# Patient Record
Sex: MALE | Race: WHITE | NOT HISPANIC OR LATINO | Employment: OTHER | URBAN - NONMETROPOLITAN AREA
[De-identification: names, ages, dates, MRNs, and addresses within clinical notes are randomized per-mention and may not be internally consistent; named-entity substitution may affect disease eponyms.]

---

## 2022-06-03 DIAGNOSIS — Z95.0 MRI SAFE CARDIAC PACEMAKER IN SITU: ICD-10-CM

## 2022-06-03 DIAGNOSIS — Z95.0 CARDIAC PACEMAKER IN SITU: Primary | ICD-10-CM

## 2022-06-03 DIAGNOSIS — I49.5 SICK SINUS SYNDROME (H): ICD-10-CM

## 2022-06-07 ENCOUNTER — HOSPITAL ENCOUNTER (OUTPATIENT)
Dept: CARDIOLOGY | Facility: OTHER | Age: 77
Discharge: HOME | End: 2022-06-07
Attending: INTERNAL MEDICINE
Payer: MEDICARE

## 2022-06-07 ENCOUNTER — HOSPITAL ENCOUNTER (OUTPATIENT)
Dept: GENERAL RADIOLOGY | Facility: OTHER | Age: 77
Discharge: HOME | End: 2022-06-07
Attending: RADIOLOGY
Payer: MEDICARE

## 2022-06-07 ENCOUNTER — HOSPITAL ENCOUNTER (OUTPATIENT)
Dept: MRI IMAGING | Facility: OTHER | Age: 77
Discharge: HOME | End: 2022-06-07
Attending: NURSE PRACTITIONER
Payer: MEDICARE

## 2022-06-07 DIAGNOSIS — Z95.0 MRI SAFE CARDIAC PACEMAKER IN SITU: ICD-10-CM

## 2022-06-07 DIAGNOSIS — G62.9 PERIPHERAL POLYNEUROPATHY: ICD-10-CM

## 2022-06-07 DIAGNOSIS — M54.50 CHRONIC BILATERAL LOW BACK PAIN WITHOUT SCIATICA: ICD-10-CM

## 2022-06-07 DIAGNOSIS — I49.5 SICK SINUS SYNDROME (H): ICD-10-CM

## 2022-06-07 DIAGNOSIS — Z95.0 CARDIAC PACEMAKER IN SITU: ICD-10-CM

## 2022-06-07 DIAGNOSIS — G89.29 CHRONIC BILATERAL LOW BACK PAIN WITHOUT SCIATICA: ICD-10-CM

## 2022-06-07 DIAGNOSIS — Z45.018 ENCOUNTER FOR INTERROGATION OF CARDIAC PACEMAKER: ICD-10-CM

## 2022-06-07 PROCEDURE — 93280 PM DEVICE PROGR EVAL DUAL: CPT | Performed by: INTERNAL MEDICINE

## 2022-06-07 PROCEDURE — 71046 X-RAY EXAM CHEST 2 VIEWS: CPT

## 2022-06-07 PROCEDURE — 72148 MRI LUMBAR SPINE W/O DYE: CPT

## 2022-06-10 LAB
MDC_IDC_LEAD_IMPLANT_DT: NORMAL
MDC_IDC_LEAD_LOCATION: NORMAL
MDC_IDC_LEAD_LOCATION_DETAIL_1: NORMAL
MDC_IDC_LEAD_MFG: NORMAL
MDC_IDC_LEAD_MODEL: NORMAL
MDC_IDC_LEAD_POLARITY_TYPE: NORMAL
MDC_IDC_LEAD_SERIAL: NORMAL
MDC_IDC_MSMT_BATTERY_DTM: NORMAL
MDC_IDC_MSMT_BATTERY_DTM: NORMAL
MDC_IDC_MSMT_BATTERY_REMAINING_LONGEVITY: 168 MO
MDC_IDC_MSMT_BATTERY_REMAINING_LONGEVITY: 168 MO
MDC_IDC_MSMT_BATTERY_STATUS: NORMAL
MDC_IDC_MSMT_BATTERY_STATUS: NORMAL
MDC_IDC_MSMT_LEADCHNL_RA_IMPEDANCE_VALUE: 636 OHM
MDC_IDC_MSMT_LEADCHNL_RA_IMPEDANCE_VALUE: 651 OHM
MDC_IDC_MSMT_LEADCHNL_RA_PACING_THRESHOLD_AMPLITUDE: 1.1 V
MDC_IDC_MSMT_LEADCHNL_RA_PACING_THRESHOLD_AMPLITUDE: 1.1 V
MDC_IDC_MSMT_LEADCHNL_RA_PACING_THRESHOLD_PULSEWIDTH: 0.4 MS
MDC_IDC_MSMT_LEADCHNL_RA_PACING_THRESHOLD_PULSEWIDTH: 0.4 MS
MDC_IDC_MSMT_LEADCHNL_RA_SENSING_INTR_AMPL: 2.5 MV
MDC_IDC_MSMT_LEADCHNL_RA_SENSING_INTR_AMPL: 4 MV
MDC_IDC_MSMT_LEADCHNL_RV_IMPEDANCE_VALUE: 649 OHM
MDC_IDC_MSMT_LEADCHNL_RV_IMPEDANCE_VALUE: 668 OHM
MDC_IDC_MSMT_LEADCHNL_RV_PACING_THRESHOLD_AMPLITUDE: 0.7 V
MDC_IDC_MSMT_LEADCHNL_RV_PACING_THRESHOLD_AMPLITUDE: 0.7 V
MDC_IDC_MSMT_LEADCHNL_RV_PACING_THRESHOLD_PULSEWIDTH: 0.4 MS
MDC_IDC_MSMT_LEADCHNL_RV_PACING_THRESHOLD_PULSEWIDTH: 0.4 MS
MDC_IDC_MSMT_LEADCHNL_RV_SENSING_INTR_AMPL: 10.2 MV
MDC_IDC_MSMT_LEADCHNL_RV_SENSING_INTR_AMPL: 10.4 MV
MDC_IDC_PG_IMPLANT_DTM: NORMAL
MDC_IDC_PG_IMPLANT_DTM: NORMAL
MDC_IDC_PG_MFG: NORMAL
MDC_IDC_PG_MFG: NORMAL
MDC_IDC_PG_MODEL: NORMAL
MDC_IDC_PG_MODEL: NORMAL
MDC_IDC_PG_SERIAL: NORMAL
MDC_IDC_PG_SERIAL: NORMAL
MDC_IDC_PG_TYPE: NORMAL
MDC_IDC_PG_TYPE: NORMAL
MDC_IDC_SESS_CLINIC_NAME: NORMAL
MDC_IDC_SESS_CLINIC_NAME: NORMAL
MDC_IDC_SESS_DTM: NORMAL
MDC_IDC_SESS_DTM: NORMAL
MDC_IDC_SESS_TYPE: NORMAL
MDC_IDC_SESS_TYPE: NORMAL
MDC_IDC_SET_BRADY_AT_MODE_SWITCH_MODE: NORMAL
MDC_IDC_SET_BRADY_AT_MODE_SWITCH_MODE: NORMAL
MDC_IDC_SET_BRADY_AT_MODE_SWITCH_RATE: 170 {BEATS}/MIN
MDC_IDC_SET_BRADY_AT_MODE_SWITCH_RATE: 170 {BEATS}/MIN
MDC_IDC_SET_BRADY_LOWRATE: 60 {BEATS}/MIN
MDC_IDC_SET_BRADY_MAX_SENSOR_RATE: 130 {BEATS}/MIN
MDC_IDC_SET_BRADY_MAX_TRACKING_RATE: 130 {BEATS}/MIN
MDC_IDC_SET_BRADY_MODE: NORMAL
MDC_IDC_SET_BRADY_MODE: NORMAL
MDC_IDC_SET_BRADY_PAV_DELAY_HIGH: 150 MS
MDC_IDC_SET_BRADY_PAV_DELAY_HIGH: 150 MS
MDC_IDC_SET_BRADY_PAV_DELAY_LOW: 270 MS
MDC_IDC_SET_BRADY_PAV_DELAY_LOW: 270 MS
MDC_IDC_SET_BRADY_SAV_DELAY_HIGH: 150 MS
MDC_IDC_SET_BRADY_SAV_DELAY_HIGH: 150 MS
MDC_IDC_SET_BRADY_SAV_DELAY_LOW: 270 MS
MDC_IDC_SET_BRADY_SAV_DELAY_LOW: 270 MS
MDC_IDC_SET_LEADCHNL_RA_PACING_AMPLITUDE: 2 V
MDC_IDC_SET_LEADCHNL_RA_PACING_CAPTURE_MODE: NORMAL
MDC_IDC_SET_LEADCHNL_RA_PACING_CAPTURE_MODE: NORMAL
MDC_IDC_SET_LEADCHNL_RA_PACING_POLARITY: NORMAL
MDC_IDC_SET_LEADCHNL_RA_PACING_POLARITY: NORMAL
MDC_IDC_SET_LEADCHNL_RA_PACING_PULSEWIDTH: 0.4 MS
MDC_IDC_SET_LEADCHNL_RA_SENSING_ADAPTATION_MODE: NORMAL
MDC_IDC_SET_LEADCHNL_RA_SENSING_ADAPTATION_MODE: NORMAL
MDC_IDC_SET_LEADCHNL_RA_SENSING_POLARITY: NORMAL
MDC_IDC_SET_LEADCHNL_RA_SENSING_POLARITY: NORMAL
MDC_IDC_SET_LEADCHNL_RA_SENSING_SENSITIVITY: 0.4 MV
MDC_IDC_SET_LEADCHNL_RA_SENSING_SENSITIVITY: 0.4 MV
MDC_IDC_SET_LEADCHNL_RV_PACING_AMPLITUDE: 2.5 V
MDC_IDC_SET_LEADCHNL_RV_PACING_CAPTURE_MODE: NORMAL
MDC_IDC_SET_LEADCHNL_RV_PACING_CAPTURE_MODE: NORMAL
MDC_IDC_SET_LEADCHNL_RV_PACING_POLARITY: NORMAL
MDC_IDC_SET_LEADCHNL_RV_PACING_POLARITY: NORMAL
MDC_IDC_SET_LEADCHNL_RV_PACING_PULSEWIDTH: 0.4 MS
MDC_IDC_SET_LEADCHNL_RV_SENSING_ADAPTATION_MODE: NORMAL
MDC_IDC_SET_LEADCHNL_RV_SENSING_ADAPTATION_MODE: NORMAL
MDC_IDC_SET_LEADCHNL_RV_SENSING_POLARITY: NORMAL
MDC_IDC_SET_LEADCHNL_RV_SENSING_POLARITY: NORMAL
MDC_IDC_SET_LEADCHNL_RV_SENSING_SENSITIVITY: 1.5 MV
MDC_IDC_SET_LEADCHNL_RV_SENSING_SENSITIVITY: 1.5 MV
MDC_IDC_SET_ZONE_DETECTION_INTERVAL: 375 MS
MDC_IDC_SET_ZONE_DETECTION_INTERVAL: 375 MS
MDC_IDC_SET_ZONE_TYPE: NORMAL
MDC_IDC_SET_ZONE_TYPE: NORMAL
MDC_IDC_SET_ZONE_VENDOR_TYPE: NORMAL
MDC_IDC_SET_ZONE_VENDOR_TYPE: NORMAL
MDC_IDC_STAT_BRADY_DTM_END: NORMAL
MDC_IDC_STAT_BRADY_DTM_END: NORMAL
MDC_IDC_STAT_BRADY_DTM_START: NORMAL
MDC_IDC_STAT_BRADY_DTM_START: NORMAL
MDC_IDC_STAT_BRADY_RA_PERCENT_PACED: 3 %
MDC_IDC_STAT_BRADY_RA_PERCENT_PACED: 3 %
MDC_IDC_STAT_BRADY_RV_PERCENT_PACED: 0 %
MDC_IDC_STAT_BRADY_RV_PERCENT_PACED: 0 %
MDC_IDC_STAT_EPISODE_RECENT_COUNT: 0
MDC_IDC_STAT_EPISODE_RECENT_COUNT: 0
MDC_IDC_STAT_EPISODE_RECENT_COUNT_DTM_END: NORMAL
MDC_IDC_STAT_EPISODE_RECENT_COUNT_DTM_END: NORMAL
MDC_IDC_STAT_EPISODE_RECENT_COUNT_DTM_START: NORMAL
MDC_IDC_STAT_EPISODE_RECENT_COUNT_DTM_START: NORMAL
MDC_IDC_STAT_EPISODE_TOTAL_COUNT: 1
MDC_IDC_STAT_EPISODE_TOTAL_COUNT: 1
MDC_IDC_STAT_EPISODE_TOTAL_COUNT_DTM_END: NORMAL
MDC_IDC_STAT_EPISODE_TOTAL_COUNT_DTM_END: NORMAL
MDC_IDC_STAT_EPISODE_TYPE: NORMAL
MDC_IDC_STAT_EPISODE_VENDOR_TYPE: NORMAL

## 2022-08-08 ENCOUNTER — APPOINTMENT (OUTPATIENT)
Dept: LAB | Facility: OTHER | Age: 77
End: 2022-08-08
Attending: INTERNAL MEDICINE
Payer: MEDICARE

## 2024-01-01 ENCOUNTER — ONCOLOGY VISIT (OUTPATIENT)
Dept: RADIATION ONCOLOGY | Facility: HOSPITAL | Age: 79
End: 2024-01-01
Attending: RADIOLOGY
Payer: MEDICARE

## 2024-01-01 ENCOUNTER — TELEPHONE (OUTPATIENT)
Dept: RADIATION ONCOLOGY | Facility: HOSPITAL | Age: 79
End: 2024-01-01

## 2024-01-01 ENCOUNTER — TELEPHONE (OUTPATIENT)
Dept: RADIATION ONCOLOGY | Facility: HOSPITAL | Age: 79
End: 2024-01-01
Payer: MEDICARE

## 2024-01-01 VITALS
SYSTOLIC BLOOD PRESSURE: 128 MMHG | WEIGHT: 206 LBS | TEMPERATURE: 98.9 F | HEART RATE: 105 BPM | OXYGEN SATURATION: 96 % | BODY MASS INDEX: 27.9 KG/M2 | HEIGHT: 72 IN | DIASTOLIC BLOOD PRESSURE: 70 MMHG

## 2024-01-01 DIAGNOSIS — C90.00 MULTIPLE MYELOMA NOT HAVING ACHIEVED REMISSION (H): Primary | ICD-10-CM

## 2024-01-01 PROCEDURE — 99204 OFFICE O/P NEW MOD 45 MIN: CPT | Performed by: RADIOLOGY

## 2024-01-01 PROCEDURE — G0463 HOSPITAL OUTPT CLINIC VISIT: HCPCS

## 2024-01-01 RX ORDER — LORAZEPAM 0.5 MG/1
0.5 TABLET ORAL
COMMUNITY
Start: 2022-09-01

## 2024-01-01 RX ORDER — SERTRALINE HYDROCHLORIDE 100 MG/1
1 TABLET, FILM COATED ORAL DAILY
COMMUNITY
Start: 2023-01-01

## 2024-01-01 RX ORDER — LENALIDOMIDE 25 MG/1
25 CAPSULE ORAL
COMMUNITY
Start: 2024-01-01 | End: 2024-01-01

## 2024-01-01 RX ORDER — LIDOCAINE 50 MG/G
1 PATCH TOPICAL EVERY 24 HOURS
COMMUNITY
Start: 2023-01-01

## 2024-01-01 RX ORDER — ROSUVASTATIN CALCIUM 10 MG/1
1 TABLET, COATED ORAL DAILY
COMMUNITY
Start: 2023-01-01

## 2024-01-01 RX ORDER — DEXAMETHASONE 4 MG/1
20 TABLET ORAL
COMMUNITY
Start: 2023-01-01 | End: 2024-02-13

## 2024-01-01 RX ORDER — FLUTICASONE PROPIONATE AND SALMETEROL 113; 14 UG/1; UG/1
1 POWDER, METERED RESPIRATORY (INHALATION)
COMMUNITY
Start: 2023-01-01

## 2024-01-01 RX ORDER — ALBUTEROL SULFATE 90 UG/1
2 AEROSOL, METERED RESPIRATORY (INHALATION)
COMMUNITY
Start: 2023-01-01

## 2024-01-01 RX ORDER — SULFAMETHOXAZOLE/TRIMETHOPRIM 800-160 MG
1 TABLET ORAL
COMMUNITY
Start: 2023-01-01

## 2024-01-01 RX ORDER — ACYCLOVIR 400 MG/1
400 TABLET ORAL
COMMUNITY
Start: 2024-01-01

## 2024-01-01 RX ORDER — QUETIAPINE FUMARATE 25 MG/1
50 TABLET, FILM COATED ORAL
COMMUNITY
Start: 2023-01-01

## 2024-01-01 RX ORDER — BUPROPION HYDROCHLORIDE 150 MG/1
150 TABLET, EXTENDED RELEASE ORAL
COMMUNITY
Start: 2023-01-01

## 2024-01-01 RX ORDER — IPRATROPIUM BROMIDE AND ALBUTEROL SULFATE 2.5; .5 MG/3ML; MG/3ML
3 SOLUTION RESPIRATORY (INHALATION)
COMMUNITY
Start: 2023-01-01

## 2024-01-01 RX ORDER — MEMANTINE HYDROCHLORIDE 5 MG/1
1 TABLET ORAL DAILY
COMMUNITY
Start: 2023-01-01

## 2024-01-01 RX ORDER — BUSPIRONE HYDROCHLORIDE 10 MG/1
1 TABLET ORAL 3 TIMES DAILY PRN
COMMUNITY
Start: 2023-01-01

## 2024-01-01 RX ORDER — GABAPENTIN 300 MG/1
600 CAPSULE ORAL
COMMUNITY
Start: 2023-01-01

## 2024-01-01 RX ORDER — HYDROMORPHONE HYDROCHLORIDE 2 MG/1
2 TABLET ORAL EVERY 4 HOURS PRN
COMMUNITY

## 2024-01-01 RX ORDER — ONDANSETRON 4 MG/1
4 TABLET, ORALLY DISINTEGRATING ORAL
COMMUNITY
Start: 2023-01-01

## 2024-01-01 ASSESSMENT — ENCOUNTER SYMPTOMS
COUGH: 1
FEVER: 0
INSOMNIA: 1
SHORTNESS OF BREATH: 1
CHILLS: 0

## 2024-01-01 ASSESSMENT — PATIENT HEALTH QUESTIONNAIRE - PHQ9: SUM OF ALL RESPONSES TO PHQ QUESTIONS 1-9: 14

## 2024-01-01 ASSESSMENT — PAIN SCALES - GENERAL: PAINLEVEL: MILD PAIN (2)

## 2024-01-22 NOTE — PROGRESS NOTES
[facility]    RADIATION ONCOLOGY CONSULTATION      Yeni Ott  is referred by Dr. Jeffery for an oncology consultation.    PRIMARY PHYSICIAN: No Ref-Primary, Physician     Cancer Staging   No matching staging information was found for the patient.      IMPRESSION: The patient has multiple myeloma and is currently on therapy.  He has not achieved remission.  He has pain in the thoracic spine area which he rates as 10/10.  MRI shows a clear metastatic deposit at T6 with compression and a retropulsed bone fragment which does not appear to be compromising the spinal canal significantly.  There are other vertebral bodies that show slight compression although only at T8 is there a suggestion of possible metastatic disease.  We will therefore treat the T6-T8 vertebral bodies with palliative radiation.  We will consider 3000 cGy in 10 fractions.  The risk and benefits of treatment including acute side effects and potential complications have been discussed with the patient.    PLAN: The current plan is to deliver 3000 cGy in 10 fractions to the T6-T8 vertebral bodies for pain control.    No orders of the defined types were placed in this encounter.     ______________________________________________________________________  HISTORY OF PRESENT ILLNESS: Yeni is a 78-year-old male patient who presents for radiation therapy consultation with multiple myeloma not having achieved remission.  Patient is here for consideration of palliation to the thoracic spine.  Patient underwent an MRI of the thoracic and lumbar spine on January 17 in which it shows a T6 compression deformity, diffuse marrow signal abnormality and lateral generous enhancement consistent with osseous metastases.  In addition he has mild superior endplate compression deformities at T3, T4, and T8.  The lumbar spine shows osseous marrow edema and contrast-enhancement confined to the fracture deformity less suggestive of metastatic disease. Patient has  been experiencing pain rates as 10/10 to the mid back region. Is taking pain medication which is helpful. History as below.     9/28/2021 bone marrow, aspirate smears, clot, core biopsy and peripheral blood: Plasma cell myeloma in normocellular to mildly hypercellular bone marrow (30-60%), shadowing otherwise maturing trilineage hematopoiesis, adequate iron stores and approximately 20% atypical plasma cells on the core biopsy.    3/23/2023 bone marrow aspirate and biopsy: Lambda monoclonal plasma cell myeloma estimated at 30% of marrow cellularity.  Otherwise adequate appearing trilineage hematopoiesis.    8/18/2023 bone marrow aspirate, clot section and core biopsy: History of lambda restricted plasma cell, status post treatment, residual disease identified (10% plasma cells).  There is normocellular bone marrow (30-35% cellularity (with: Residual lambda restricted plasmacytic infiltrate.  Trilineage hematopoiesis adequate and maturing.    8/18/2023 bone marrow aspirate: Small lambda restricted plasma cell population identified with apparent coexpression of CD56, numbering 0.16%.  No immunophenotypic evidence of lymphoma or blast population    11/1/2023 CT thoracic spine with contrast: Acute or subacute fractures at T3 and T6    1/17/2024 MRI thoracic spine with and without contrast: Redemonstrated T6 compression deformity with mild retropulsion.  Diffuse marrow signal abnormality and heterogenous enhancement consistent with osseous metastasis.  Mild superior endplate compression deformities at T3-T4, and T8, though the precontrast marrow signal is less suggestive of metastasis.    1/17/2024 MRI lumbar spine with and without contrast: Mild L1 superior endplate compression deformity with minimal retropulsion and evidence of acuity.  Osseous marrow edema and contrast-enhancement appears confined to the fracture deformity, less suggestive of metastatic disease.  Mild degenerative changes noted.    Medical oncology  visit: Patient updated with MRI results, referred for consideration of radiation to the thoracic spine.    Scheduling Conflicts: Yes, wife helps correlate around other appointments   Systemic Therapy: Bortezomib (last dose 1/17/24), next dose 1/24  Pacemaker: YES  Hx of autoimmune disorders: denies   Previous Radiation Therapy: denies       Depression Screening Follow Up        1/24/2024    11:56 AM   PHQ   PHQ-9 Total Score 14   Q9: Thoughts of better off dead/self-harm past 2 weeks Not at all         1/24/2024    11:56 AM   Last PHQ-9   1.  Little interest or pleasure in doing things 2   2.  Feeling down, depressed, or hopeless 0   3.  Trouble falling or staying asleep, or sleeping too much 3   4.  Feeling tired or having little energy 2   5.  Poor appetite or overeating 3   6.  Feeling bad about yourself 0   7.  Trouble concentrating 2   8.  Moving slowly or restless 2   Q9: Thoughts of better off dead/self-harm past 2 weeks 0   PHQ-9 Total Score 14   Difficulty at work, home, or with people Very difficult       Follow Up Actions Taken  Crisis resource information provided in After Visit Summary  Patient counseled, no additional follow up at this time.               Past Medical History:   Diagnosis Date    Achilles tendinitis of right lower extremity 07/21/2021    Formatting of this note might be different from the original.   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   Added automatically from request for surgery [id number]    Moderate dementia associated with other underlying disease, without behavioral disturbance, psychotic disturbance, mood disturbance, or anxiety (H) 03/30/2023    Multiple myeloma not having achieved remission (H) 03/21/2023    Pacemaker 01/18/2021    Formatting of this note might be different from the original.   1/15/79027 Dr. Crespo- Syncope, RBBB,LBBB   1/15/21 [employer] Accolade L331 SN#[id number]   1/15/21-RA-Yovana. Sci. 7841 [id number]   1/15/21 RV septum Yovana. Sci. 7842  SN#[id number]   Sinus rhythm 90 bpm. No magnet needed with cautery.    Peripheral polyneuropathy 11/22/2016    Formatting of this note might be different from the original.   Has not been diagnosed to cause, pt declined    Prediabetes 06/18/2014    Primary insomnia 03/30/2023    Primary osteoarthritis of right hip 01/10/2020    Right bundle branch block 12/23/2020    Formatting of this note might be different from the original.   Added automatically from request for surgery [id number]    Syncope, unspecified syncope type 12/23/2020    Formatting of this note might be different from the original.   Added automatically from request for surgery [id number]    Tobacco use disorder 11/30/2011    Tremor of both hands 03/30/2023    Weakness of both lower extremities 03/30/2023       Past Surgical History:   Procedure Laterality Date    ARTHROPLASTY HIP Right 02/20/2020    AS FEMUR/KNEE SURG UNLISTED Left     knee    COLONOSCOPY  05/03/2018    COLONOSCOPY  03/16/2023    pacemaker placement  01/15/2021       Family History   Problem Relation Age of Onset    Breast Cancer Sister        Social History     Tobacco Use    Smoking status: Every Day     Years: 50     Types: Cigarettes    Smokeless tobacco: Not on file    Tobacco comments:     5 cigs/day   Substance Use Topics     Alcohol use: Not Currently         CURRENT MEDICATIONS:   Current Outpatient Medications   Medication    Acetaminophen (TYLENOL PO)    acyclovir (ZOVIRAX) 400 MG tablet    albuterol (PROAIR HFA/PROVENTIL HFA/VENTOLIN HFA) 108 (90 Base) MCG/ACT inhaler    buPROPion (WELLBUTRIN SR) 150 MG 12 hr tablet    busPIRone (BUSPAR) 10 MG tablet    dexAMETHasone (DECADRON) 4 MG tablet    fluticasone-salmeterol (AIRDUO RESPICLICK) 113-14 MCG/ACT inhaler    gabapentin (NEURONTIN) 300 MG capsule    HYDROmorphone (DILAUDID) 2 MG tablet    ipratropium - albuterol 0.5 mg/2.5 mg/3 mL (DUONEB) 0.5-2.5 (3) MG/3ML neb solution    LENalidomide (REVLIMID) 25 MG CAPS capsule    lidocaine (LIDODERM) 5 % patch    LORazepam (ATIVAN) 0.5 MG tablet    magic mouthwash (ENTER INGREDIENTS IN COMMENTS) suspension    memantine (NAMENDA) 5 MG tablet    omeprazole (PRILOSEC) 20 MG DR capsule    ondansetron (ZOFRAN ODT) 4 MG ODT tab    QUEtiapine (SEROQUEL) 25 MG tablet    rosuvastatin (CRESTOR) 10 MG tablet    sertraline (ZOLOFT) 100 MG tablet    sulfamethoxazole-trimethoprim (BACTRIM DS) 800-160 MG tablet     No current facility-administered medications for this visit.         ALLERGIES:  Allergies   Allergen Reactions    Other Environmental Allergy Anaphylaxis     DEER HIDE, rabbit fur (most animal furs cause a reaction based on dander)    Oxycodone-Acetaminophen Itching           Review of Systems   Constitutional:  Positive for malaise/fatigue. Negative for chills and fever.   HENT:          Not eating much, has a poor appetite, is drinking 2-3 protein drinks per day     Respiratory:  Positive for cough (coughs up mucous (chronic)) and shortness of breath (has COPD).         Chronically on 2.5L O2  Has COPD      Cardiovascular:  Negative for chest pain.   Musculoskeletal:         Uses walker at home, wheelchair when out      Neurological:         Has frequent syncopal episodes (reports they have been occurring for years)     Psychiatric/Behavioral:   The patient has insomnia (due to pain).            VITAL SIGNS:  /70 (BP Location: Right arm, Patient Position: Chair, Cuff Size: Adult Regular)   Pulse 105   Temp 98.9  F (37.2  C) (Tympanic)   Ht 1.829 m (6')   Wt 93.4 kg (206 lb)   SpO2 96%   BMI 27.94 kg/m    Wt Readings from Last 4 Encounters:   01/24/24 93.4 kg (206 lb)   08/20/15 90.8 kg (200 lb 3.2 oz)   04/11/14 90.2 kg (198 lb 12.8 oz)   10/24/13 86.2 kg (190 lb 1.6 oz)       PHYSICAL EXAM:  Constitutional: awake, alert, cooperative  Eyes: Lids and lashes normal, sclera clear, conjunctiva normal  ENT: Normocephalic, without obvious abnormality, atraumatic  Hematologic / Lymphatic: no cervical lymphadenopathy and no supraclavicular lymphadenopathy  Respiratory: Poor air exchange throughout, very distance lunch sounds   Cardiovascular: normal S1 and S2  GI: Normal bowel sounds, soft, non-distended, non-tender  Skin: no redness, warmth, or swelling  Musculoskeletal: tone is normal  Neurologic: Awake, alert, oriented to name, place and time. Presents in wheel chair.  Neuropsychiatric: General: normal, calm, and normal eye contact        TONI Jeffries CNP    Jr Cabrera MD

## 2024-01-23 PROBLEM — M16.11 PRIMARY OSTEOARTHRITIS OF RIGHT HIP: Status: ACTIVE | Noted: 2020-01-10

## 2024-01-23 PROBLEM — Q79.8 CONGENITAL CONTRACTURE OF GASTROCNEMIUS: Status: ACTIVE | Noted: 2021-07-21

## 2024-01-23 PROBLEM — M54.50 CHRONIC BILATERAL LOW BACK PAIN WITHOUT SCIATICA: Status: ACTIVE | Noted: 2023-01-16

## 2024-01-23 PROBLEM — G89.29 CHRONIC BILATERAL LOW BACK PAIN WITHOUT SCIATICA: Status: ACTIVE | Noted: 2023-01-16

## 2024-01-23 PROBLEM — R26.2 DIFFICULTY WALKING: Status: ACTIVE | Noted: 2021-10-29

## 2024-01-23 PROBLEM — F02.B0: Status: ACTIVE | Noted: 2023-01-01

## 2024-01-23 PROBLEM — I45.10 RIGHT BUNDLE BRANCH BLOCK: Status: ACTIVE | Noted: 2020-12-23

## 2024-01-23 PROBLEM — R29.898 WEAKNESS OF BOTH LOWER EXTREMITIES: Status: ACTIVE | Noted: 2023-01-01

## 2024-01-23 PROBLEM — I44.4 LEFT ANTERIOR FASCICULAR BLOCK: Status: ACTIVE | Noted: 2020-12-23

## 2024-01-23 PROBLEM — R55 SYNCOPE, UNSPECIFIED SYNCOPE TYPE: Status: ACTIVE | Noted: 2020-12-23

## 2024-01-23 PROBLEM — C90.00 MULTIPLE MYELOMA NOT HAVING ACHIEVED REMISSION (H): Status: ACTIVE | Noted: 2023-01-01

## 2024-01-23 PROBLEM — F51.01 PRIMARY INSOMNIA: Status: ACTIVE | Noted: 2023-01-01

## 2024-01-23 PROBLEM — M76.61 ACHILLES TENDINITIS OF RIGHT LOWER EXTREMITY: Status: ACTIVE | Noted: 2021-07-21

## 2024-01-23 PROBLEM — R80.3 FREE MONOCLONAL LIGHT CHAIN: Status: ACTIVE | Noted: 2021-08-18

## 2024-01-23 PROBLEM — Z95.0 PACEMAKER: Status: ACTIVE | Noted: 2021-01-18

## 2024-01-23 PROBLEM — F41.1 GAD (GENERALIZED ANXIETY DISORDER): Status: ACTIVE | Noted: 2020-02-28

## 2024-01-23 PROBLEM — R25.1 TREMOR OF BOTH HANDS: Status: ACTIVE | Noted: 2023-01-01

## 2024-01-24 NOTE — PROGRESS NOTES
"Radiation Oncology Rooming Note    January 24, 2024 10:35 AM     Yeni Ott is a 78 year old male who presents for:       Chief Complaint   Patient presents with    Consult     Radiation therapy consult       Initial Vitals: /70 (BP Location: Right arm, Patient Position: Chair, Cuff Size: Adult Regular)   Pulse 105   Temp 98.9  F (37.2  C) (Tympanic)   Ht 1.829 m (6')   Wt 93.4 kg (206 lb)   SpO2 96%   BMI 27.94 kg/m     Estimated body mass index is 27.94 kg/m  as calculated from the following:    Height as of this encounter: 1.829 m (6').    Weight as of this encounter: 93.4 kg (206 lb).   Body surface area is 2.18 meters squared.  Mild Pain (2) Comment: Data Unavailable       Allergies reviewed: Yes  Medications reviewed: Yes      Patient completed the following questionnaires: PHQ-9 and NCCN Distress Thermometer.       Patient was assessed using the NCCN psychosocial distress thermometer. Patient rated the score as a five. Patient rated current stressors as depression, nervousness, worry, loss of interest in usual activities, bathing/dressing, breathing, changes in urination, constipation, eating, fatigue, getting around, memory/concentration, mouth sores, nausea, pain, skin dry/itchy, and sleep. Stressors brought to the attention of Romana Silverio CNP for a score of 6 or greater or per nurses discretion.       Nutrition Assessment    Height: 6' 0\" Weight: 206 lbs 0 oz    Usual Weight: 206# Weight Change: 0      1. Receiving Chemotherapy? Yes  - 1 point  2. Weight Loss per Month (in pounds) Based on Usual Weight: 0    100# 100-120# 120-150# 150-200# greater than 200# Pt. System   greater than 5 5 - 6 6 - 8 7 - 10 greater than 10 1 Point   greater than 7 7- 9 9 - 11 11 - 14 greater than 15 2 Points   greater than 10 10 - 12 12 - 15 15 - 20 greater than 20 3 Points       3. Eating Problems: ( 1 Point for Each Problem)       Loss of Appetite     Yes  - 1 point    Difficulty Swallowing    Yes  - 1 " "point-occasional difficulty swallowing when increase in phlegm   Nausea    No - 0 points    Early Satiety    Yes  - 1 point   Vomiting    No - 0 points    Taste/Smell Aversions  Yes  - 1 point Food taste \"funny\"   Diarrhea    No - 0 points    Esophageal Reflux   Yes  - 1 point-takes prilosec   Mouth Soreness/Difficulty Chewing  No - 0 points          Total: 5    4.  Automatic Referral for the Following Diagnosis or Situations:  Declines at this time       Total Score: 6       Patient received folder containing the following:  radiation planning process packet  radiation therapy timeline  financial letter  vaccines during cancer treatment hand out  mental health services and providers packet  cancer resource list  cancer rehab information handout  family support group handouts   business card  radiation therapy business card      Financial assistance resources given to patient:  [employer] application       Mee Grissom LPN  "

## 2024-01-30 NOTE — TELEPHONE ENCOUNTER
Simulation canceled 1/31/24.  Called for an update.  Spoke with wife, Leanna.  She reports that Yeni has decided, after discussion with family, that he does not want to have radiation therapy treatment.  He does not want more doctor appointments than what he already has.  He is scheduled to see Dr. Jeffery on 2/5/24 to discuss treatment goals.  Wife is aware that she can reach out to us if they have any questions or concerns.

## 2024-01-30 NOTE — TELEPHONE ENCOUNTER
PATIENT WIFE CALL TO CANCEL APPOINTMENT AFTER HAVING DISCUSSION WITH FAMILY MEMBER   PATIENT HAS DECIDED NOT TO HAVE RADIATION THERAPY